# Patient Record
Sex: FEMALE | Race: WHITE
[De-identification: names, ages, dates, MRNs, and addresses within clinical notes are randomized per-mention and may not be internally consistent; named-entity substitution may affect disease eponyms.]

---

## 2017-12-23 ENCOUNTER — HOSPITAL ENCOUNTER (OUTPATIENT)
Dept: HOSPITAL 10 - OBT | Age: 32
Discharge: HOME | End: 2017-12-23
Attending: OBSTETRICS & GYNECOLOGY
Payer: MEDICAID

## 2017-12-23 VITALS
HEIGHT: 66 IN | BODY MASS INDEX: 31.14 KG/M2 | BODY MASS INDEX: 31.14 KG/M2 | WEIGHT: 193.79 LBS | WEIGHT: 193.79 LBS | HEIGHT: 66 IN

## 2017-12-23 VITALS — SYSTOLIC BLOOD PRESSURE: 121 MMHG | HEART RATE: 84 BPM | DIASTOLIC BLOOD PRESSURE: 84 MMHG

## 2017-12-23 DIAGNOSIS — Z3A.37: ICD-10-CM

## 2017-12-23 PROCEDURE — G0463 HOSPITAL OUTPT CLINIC VISIT: HCPCS

## 2017-12-23 NOTE — TRIAGE
===================================

OB Triage

===================================

Datetime Report Generated by CPN: 12/23/2017 16:37

   

   

===========================

Datetime: 12/23/2017 16:02

===========================

   

 Stage of Pregnancy:  OB Triage

   

===========================

Datetime: 12/23/2017 15:49

===========================

   

 Stage of Pregnancy:  OB Triage

   

===========================

Datetime: 12/23/2017 15:33

===========================

   

 Stage of Pregnancy:  Labor

 Assessment Type:  Triage

   

===================================

Maternal Assessment

===================================

   

 Level of Consciousness:  Fully Conscious

 DTR's/Clonus:  DTRs 2+; No Clonus

 Headache:  Denies

 Blurred Vision:  No

 Respiratory Effort:  Unlabored; Regular Rhythm; Equal Expansion

 Breath Sounds, Left:  Clear and Equal

 Breath Sounds, Right:  Clear and Equal

 Nausea/Vomiting:  Denies

 RUQ Epigastric Pain:  Denies

 Facial Edema:  None

 Temperature Route:  Axillary

   

===================================

Fall Risk Assessment

===================================

   

 History of Falling:  (0) No

 Secondary Diagnosis:  (0) No

 Ambulatory Aid:  (0) Bedrest/Nurse Assist

 IV Therapy:  (0) No

 Gait:  (0) Normal/Bedrest/Immobile

 Mental Status:  (0) Oriented to Own Ability

 Fall Score:  0

 Fall Risk Score Definition:  No Risk: No action required

   

===================================

Labor Evaluation

===================================

   

 Frequency:  0

 Monitor Mode:  External

 Resting Tone [place]:  Relaxed

 Fetal Interventions:  Sterile Vaginal Exam

   

===================================

Fetal Heart Rate

===================================

   

 FHR Baseline Rate:  135

 Monitor Mode:  External US

 Variability:  Moderate 6-25 bpm

 Accelerations:  10X10

 Decelerations:  None

 Category:  Category I

   

===================================

Pain Assessment

===================================

   

 Pain Scale:  7

 Pain Presence:  Intermittent

 Pain Type:  Cramping

 Pain Location:  Abdomen

 Pain Goal:  3

 Pain Relief Measures:  Comfort Measures

   

===================================

Vaginal Exam

===================================

   

 Dilatation (cms):  1.0

 Effacement (%):  50

 Station:  -2

 Exam By:  S HERIBERTO

 Membrane Status:  Intact

   

===========================

Datetime: 12/23/2017 15:32

===========================

   

 Time of Arrival:  12/23/2017 15:10

 EGA:  37.3

 Arrived By:  Ambulatory

 Arrived From:  Home

 Chief Complaint:  C/O UC'S THAT STARTED YESTERDAY APPROX Q7 MIN.  DENIES LEAKING OF FLUID OR BLEEDI
NG

 Fetal Movement:  Present

 Contractions:  Irregular

 Contractions:  5-7

 Rupture of Membranes:  Denies

 Vaginal Discharge:  Denies

 Recent Sexual Intercouse:  Denies

 Abdominal Trauma:  Not Applicable

 Time Provider Notified:  12/23/2017 15:49

 Provider Notified:  YOLANDE

 Initial Plan:  MONITOR, VE

## 2017-12-23 NOTE — PN
Triage Information


Date/Time





Reason for visit:  Uterine contractions


Weeks of Gestation


Patient is a 32-year-old  2 para 1 at 37 weeks and 3 days of gestation 

with estimated date of delivery January 10, 2018


She presents with complaint of uterine contractions


Patient reports positive fetal movement, no leaking fluid and no vaginal 

bleeding


/Para


 2 para 1


Diabetes:  none


Hypertention:  none





Objective





 Vital Signs








  Date Time  Temp Pulse Resp B/P Pulse Ox O2 Delivery O2 Flow Rate FiO2


 


17 15:38 98.4 84  121/84    








Fetal Heart Rate:  140's


Fetal Heart Rate Comments


Fetal heart rate tracing category 1


Contractions:  None


Exam


Vaginal exam 1 cm/50%/-2 per nurse


Disposition:  Discharge


Assessment/Plan


Fetal kick count instructions were given


Labor precautions were given


Patient will be discharged home


She was instructed to follow-up with her own OB/GYN in 2-3 days











NELIA LANIER MD Dec 23, 2017 16:15

## 2018-01-01 ENCOUNTER — HOSPITAL ENCOUNTER (OUTPATIENT)
Age: 33
LOS: 1 days | Discharge: HOME | End: 2018-01-02

## 2018-01-01 ENCOUNTER — HOSPITAL ENCOUNTER (OUTPATIENT)
Dept: HOSPITAL 91 - L-D | Age: 33
LOS: 1 days | Discharge: HOME | End: 2018-01-02
Payer: MEDICAID

## 2018-01-01 DIAGNOSIS — Z3A.38: ICD-10-CM

## 2018-01-07 ENCOUNTER — HOSPITAL ENCOUNTER (INPATIENT)
Age: 33
LOS: 2 days | Discharge: HOME | End: 2018-01-09

## 2018-01-07 ENCOUNTER — HOSPITAL ENCOUNTER (INPATIENT)
Dept: HOSPITAL 91 - OBT | Age: 33
LOS: 2 days | Discharge: HOME | End: 2018-01-09
Payer: MEDICAID

## 2018-01-07 DIAGNOSIS — Z3A.39: ICD-10-CM

## 2018-01-07 LAB
ADD MAN DIFF?: NO
ADD UMIC: YES
BASOPHIL #: 0 10^3/UL (ref 0–0.1)
BASOPHILS %: 0.4 % (ref 0–2)
EOSINOPHILS #: 0.1 10^3/UL (ref 0–0.5)
EOSINOPHILS %: 1.1 % (ref 0–7)
HEMATOCRIT: 31.9 % (ref 37–47)
HEMOGLOBIN: 10.6 G/DL (ref 12–16)
HEPATITIS B SURFACE ANTIGEN: NEGATIVE
INR: 0.89
LYMPHOCYTES #: 2.5 10^3/UL (ref 0.8–2.9)
LYMPHOCYTES %: 27.3 % (ref 15–51)
MEAN CORPUSCULAR HEMOGLOBIN: 26.1 PG (ref 29–33)
MEAN CORPUSCULAR HGB CONC: 33.2 G/DL (ref 32–37)
MEAN CORPUSCULAR VOLUME: 78.6 FL (ref 82–101)
MEAN PLATELET VOLUME: 9.6 FL (ref 7.4–10.4)
MONOCYTE #: 0.6 10^3/UL (ref 0.3–0.9)
MONOCYTES %: 6.6 % (ref 0–11)
NEUTROPHIL #: 5.9 10^3/UL (ref 1.6–7.5)
NEUTROPHILS %: 64.1 % (ref 39–77)
NUCLEATED RED BLOOD CELLS #: 0 10^3/UL (ref 0–0)
NUCLEATED RED BLOOD CELLS%: 0 /100WBC (ref 0–0)
PARTIAL THROMBOPLASTIN TIME: 25.8 SEC (ref 25–35)
PLATELET COUNT: 299 10^3/UL (ref 140–415)
PROTIME: 12.1 SEC (ref 11.9–14.9)
PT RATIO: 0.9
RAPID PLASMA REAGIN: NONREACTIVE
RED BLOOD COUNT: 4.06 10^6/UL (ref 4.2–5.4)
RED CELL DISTRIBUTION WIDTH: 14.5 % (ref 11.5–14.5)
UR ASCORBIC ACID: 40 MG/DL
UR BACTERIA: (no result) /HPF
UR BILIRUBIN (DIP): NEGATIVE MG/DL
UR BLOOD (DIP): NEGATIVE MG/DL
UR CLARITY: (no result)
UR COLOR: YELLOW
UR GLUCOSE (DIP): NEGATIVE MG/DL
UR KETONES (DIP): NEGATIVE MG/DL
UR LEUKOCYTE ESTERASE (DIP): (no result) LEU/UL
UR MUCUS: (no result) /HPF
UR NITRITE (DIP): NEGATIVE MG/DL
UR PH (DIP): 6 (ref 5–9)
UR RBC: 1 /HPF (ref 0–5)
UR SPECIFIC GRAVITY (DIP): 1.03 (ref 1–1.03)
UR SQUAMOUS EPITHELIAL CELL: (no result) /HPF
UR TOTAL PROTEIN (DIP): (no result) MG/DL
UR UROBILINOGEN (DIP): (no result) MG/DL
UR WBC: 24 /HPF (ref 0–5)
WHITE BLOOD COUNT: 9.2 10^3/UL (ref 4.8–10.8)

## 2018-01-07 PROCEDURE — 90716 VAR VACCINE LIVE SUBQ: CPT

## 2018-01-07 PROCEDURE — 80307 DRUG TEST PRSMV CHEM ANLYZR: CPT

## 2018-01-07 PROCEDURE — 87086 URINE CULTURE/COLONY COUNT: CPT

## 2018-01-07 PROCEDURE — 87340 HEPATITIS B SURFACE AG IA: CPT

## 2018-01-07 PROCEDURE — 86592 SYPHILIS TEST NON-TREP QUAL: CPT

## 2018-01-07 PROCEDURE — 86900 BLOOD TYPING SEROLOGIC ABO: CPT

## 2018-01-07 PROCEDURE — 86850 RBC ANTIBODY SCREEN: CPT

## 2018-01-07 PROCEDURE — 85025 COMPLETE CBC W/AUTO DIFF WBC: CPT

## 2018-01-07 PROCEDURE — 86901 BLOOD TYPING SEROLOGIC RH(D): CPT

## 2018-01-07 PROCEDURE — 85610 PROTHROMBIN TIME: CPT

## 2018-01-07 PROCEDURE — 90715 TDAP VACCINE 7 YRS/> IM: CPT

## 2018-01-07 PROCEDURE — 81001 URINALYSIS AUTO W/SCOPE: CPT

## 2018-01-07 PROCEDURE — 85730 THROMBOPLASTIN TIME PARTIAL: CPT

## 2018-01-07 RX ADMIN — Medication 1 SPRAY: at 17:01

## 2018-01-07 RX ADMIN — Medication 1 MLS/HR: at 16:21

## 2018-01-07 RX ADMIN — BUTORPHANOL TARTRATE 1 MG: 2 INJECTION, SOLUTION INTRAMUSCULAR; INTRAVENOUS at 11:31

## 2018-01-07 RX ADMIN — Medication 1 APPLIC: at 17:02

## 2018-01-07 RX ADMIN — Medication 1 MLS/HR: at 09:41

## 2018-01-07 RX ADMIN — WITCH HAZEL 1 PAD: 500 SOLUTION RECTAL; TOPICAL at 17:02

## 2018-01-07 RX ADMIN — DOCUSATE SODIUM AND SENNOSIDES 1 TAB: 8.6; 5 TABLET, FILM COATED ORAL at 20:35

## 2018-01-07 RX ADMIN — IBUPROFEN 1 MG: 600 TABLET ORAL at 18:28

## 2018-01-07 RX ADMIN — PYRIDOXINE HYDROCHLORIDE 1 MLS/HR: 100 INJECTION, SOLUTION INTRAMUSCULAR; INTRAVENOUS at 05:37

## 2018-01-07 RX ADMIN — Medication 1 MLS/HR: at 13:41

## 2018-01-07 RX ADMIN — IBUPROFEN 1 MG: 600 TABLET ORAL at 23:47

## 2018-01-07 RX ADMIN — AMPICILLIN 1 MLS/HR: 2 INJECTION, POWDER, FOR SOLUTION INTRAVENOUS at 04:00

## 2018-01-08 LAB
ADD MAN DIFF?: NO
BASOPHIL #: 0.1 10^3/UL (ref 0–0.1)
BASOPHILS %: 0.5 % (ref 0–2)
EOSINOPHILS #: 0.1 10^3/UL (ref 0–0.5)
EOSINOPHILS %: 0.6 % (ref 0–7)
HEMATOCRIT: 27.9 % (ref 37–47)
HEMOGLOBIN: 9.2 G/DL (ref 12–16)
LYMPHOCYTES #: 2.5 10^3/UL (ref 0.8–2.9)
LYMPHOCYTES %: 22.8 % (ref 15–51)
MEAN CORPUSCULAR HEMOGLOBIN: 26.2 PG (ref 29–33)
MEAN CORPUSCULAR HGB CONC: 33 G/DL (ref 32–37)
MEAN CORPUSCULAR VOLUME: 79.5 FL (ref 82–101)
MEAN PLATELET VOLUME: 9.5 FL (ref 7.4–10.4)
MONOCYTE #: 0.6 10^3/UL (ref 0.3–0.9)
MONOCYTES %: 5.3 % (ref 0–11)
NEUTROPHIL #: 7.6 10^3/UL (ref 1.6–7.5)
NEUTROPHILS %: 70.3 % (ref 39–77)
NUCLEATED RED BLOOD CELLS #: 0 10^3/UL (ref 0–0)
NUCLEATED RED BLOOD CELLS%: 0 /100WBC (ref 0–0)
PLATELET COUNT: 265 10^3/UL (ref 140–415)
RED BLOOD COUNT: 3.51 10^6/UL (ref 4.2–5.4)
RED CELL DISTRIBUTION WIDTH: 14.9 % (ref 11.5–14.5)
WHITE BLOOD COUNT: 10.8 10^3/UL (ref 4.8–10.8)

## 2018-01-08 RX ADMIN — DOCUSATE SODIUM AND SENNOSIDES 1 TAB: 8.6; 5 TABLET, FILM COATED ORAL at 10:06

## 2018-01-08 RX ADMIN — IBUPROFEN 1 MG: 600 TABLET ORAL at 18:03

## 2018-01-08 RX ADMIN — DOCUSATE SODIUM AND SENNOSIDES 1 TAB: 8.6; 5 TABLET, FILM COATED ORAL at 20:54

## 2018-01-08 RX ADMIN — IBUPROFEN 1 MG: 600 TABLET ORAL at 05:37

## 2018-01-08 RX ADMIN — IBUPROFEN 1 MG: 600 TABLET ORAL at 12:36

## 2018-01-09 RX ADMIN — IBUPROFEN 1 MG: 600 TABLET ORAL at 06:19

## 2018-01-09 RX ADMIN — CLOSTRIDIUM TETANI TOXOID ANTIGEN (FORMALDEHYDE INACTIVATED), CORYNEBACTERIUM DIPHTHERIAE TOXOID ANTIGEN (FORMALDEHYDE INACTIVATED), BORDETELLA PERTUSSIS TOXOID ANTIGEN (GLUTARALDEHYDE INACTIVATED), BORDETELLA PERTUSSIS FILAMENTOUS HEMAGGLUTININ ANTIGEN (FORMALDEHYDE INACTIVATED), BORDETELLA PERTUSSIS PERTACTIN ANTIGEN, AND BORDETELLA PERTUSSIS FIMBRIAE 2/3 ANTIGEN 1 ML: 5; 2; 2.5; 5; 3; 5 INJECTION, SUSPENSION INTRAMUSCULAR at 09:00

## 2018-01-09 RX ADMIN — DOCUSATE SODIUM AND SENNOSIDES 1 TAB: 8.6; 5 TABLET, FILM COATED ORAL at 12:19

## 2018-01-09 RX ADMIN — IBUPROFEN 1 MG: 600 TABLET ORAL at 12:19

## 2018-01-09 RX ADMIN — IBUPROFEN 1 MG: 600 TABLET ORAL at 00:50

## 2018-01-09 RX ADMIN — VARICELLA VIRUS VACCINE LIVE 1 UNIT: 1350 INJECTION, POWDER, LYOPHILIZED, FOR SUSPENSION SUBCUTANEOUS at 09:00

## 2018-01-09 RX ADMIN — MEASLES, MUMPS, AND RUBELLA VIRUS VACCINE LIVE 1 ML: 1000; 12500; 1000 INJECTION, POWDER, LYOPHILIZED, FOR SUSPENSION SUBCUTANEOUS at 09:00

## 2019-07-09 ENCOUNTER — HOSPITAL ENCOUNTER (OUTPATIENT)
Dept: HOSPITAL 10 - SDS | Age: 34
Discharge: HOME | End: 2019-07-09
Attending: OTOLARYNGOLOGY
Payer: COMMERCIAL

## 2019-07-09 ENCOUNTER — HOSPITAL ENCOUNTER (OUTPATIENT)
Dept: HOSPITAL 91 - SDS | Age: 34
Discharge: HOME | End: 2019-07-09
Payer: COMMERCIAL

## 2019-07-09 VITALS — SYSTOLIC BLOOD PRESSURE: 121 MMHG | RESPIRATION RATE: 14 BRPM | HEART RATE: 95 BPM | DIASTOLIC BLOOD PRESSURE: 77 MMHG

## 2019-07-09 VITALS — DIASTOLIC BLOOD PRESSURE: 78 MMHG | RESPIRATION RATE: 15 BRPM | SYSTOLIC BLOOD PRESSURE: 121 MMHG | HEART RATE: 76 BPM

## 2019-07-09 VITALS
BODY MASS INDEX: 32.07 KG/M2 | WEIGHT: 187.83 LBS | HEIGHT: 64 IN | WEIGHT: 187.83 LBS | BODY MASS INDEX: 32.07 KG/M2 | HEIGHT: 64 IN

## 2019-07-09 VITALS — HEART RATE: 92 BPM | DIASTOLIC BLOOD PRESSURE: 75 MMHG | RESPIRATION RATE: 20 BRPM | SYSTOLIC BLOOD PRESSURE: 133 MMHG

## 2019-07-09 VITALS — SYSTOLIC BLOOD PRESSURE: 129 MMHG | HEART RATE: 69 BPM | DIASTOLIC BLOOD PRESSURE: 86 MMHG | RESPIRATION RATE: 16 BRPM

## 2019-07-09 VITALS — DIASTOLIC BLOOD PRESSURE: 76 MMHG | HEART RATE: 88 BPM | SYSTOLIC BLOOD PRESSURE: 128 MMHG | RESPIRATION RATE: 18 BRPM

## 2019-07-09 VITALS — SYSTOLIC BLOOD PRESSURE: 127 MMHG | HEART RATE: 76 BPM | RESPIRATION RATE: 20 BRPM | DIASTOLIC BLOOD PRESSURE: 76 MMHG

## 2019-07-09 VITALS — SYSTOLIC BLOOD PRESSURE: 119 MMHG | HEART RATE: 75 BPM | DIASTOLIC BLOOD PRESSURE: 76 MMHG | RESPIRATION RATE: 20 BRPM

## 2019-07-09 VITALS — RESPIRATION RATE: 16 BRPM | HEART RATE: 74 BPM | SYSTOLIC BLOOD PRESSURE: 122 MMHG | DIASTOLIC BLOOD PRESSURE: 72 MMHG

## 2019-07-09 VITALS — RESPIRATION RATE: 15 BRPM | SYSTOLIC BLOOD PRESSURE: 132 MMHG | DIASTOLIC BLOOD PRESSURE: 73 MMHG | HEART RATE: 94 BPM

## 2019-07-09 VITALS — RESPIRATION RATE: 17 BRPM | DIASTOLIC BLOOD PRESSURE: 77 MMHG | SYSTOLIC BLOOD PRESSURE: 125 MMHG | HEART RATE: 86 BPM

## 2019-07-09 VITALS — SYSTOLIC BLOOD PRESSURE: 125 MMHG | RESPIRATION RATE: 15 BRPM | DIASTOLIC BLOOD PRESSURE: 77 MMHG | HEART RATE: 74 BPM

## 2019-07-09 VITALS — RESPIRATION RATE: 15 BRPM | DIASTOLIC BLOOD PRESSURE: 79 MMHG | HEART RATE: 82 BPM | SYSTOLIC BLOOD PRESSURE: 128 MMHG

## 2019-07-09 DIAGNOSIS — Q18.0: Primary | ICD-10-CM

## 2019-07-09 PROCEDURE — 42810 EXCISION OF NECK CYST: CPT

## 2019-07-09 PROCEDURE — 88304 TISSUE EXAM BY PATHOLOGIST: CPT

## 2019-07-09 RX ADMIN — FENTANYL CITRATE 1 MCG: 50 INJECTION, SOLUTION INTRAMUSCULAR; INTRAVENOUS at 19:42

## 2019-07-09 RX ADMIN — PYRIDOXINE HYDROCHLORIDE 1 MLS/HR: 100 INJECTION, SOLUTION INTRAMUSCULAR; INTRAVENOUS at 15:51

## 2019-07-09 RX ADMIN — LIDOCAINE HYDROCHLORIDE 1 ML: 10; .005 INJECTION, SOLUTION EPIDURAL; INFILTRATION; INTRACAUDAL; PERINEURAL at 18:26

## 2019-07-09 NOTE — PREAC
Date/Time of Note


Date/Time of Note


DATE: 7/9/19 


TIME: 17:39





Anesthesia Eval and Record


Evaluation


Time Pre-Procedure Interview


DATE: 7/9/19 


TIME: 17:39


Age


34


Sex


female


NPO:  8 hrs


Preoperative diagnosis


LEFT PERIAURICULAR CYST


Planned procedure


EXCISION LEFT PERIAURICULAR CYST





Past Medical History


Past Medical History:  Includes


GI:  Obesity





Surgery & Anesthesia Issues


No known issue





Meds


Anticoagulation:  No


Beta Blocker within 24 hr:  No


Reason Beta Blocker not given:  Pt. not on B-Blocker


Reported Medications


PNV115-Iron Fumarate-FA-DSS (Prenatal 19) 1 Each Tablet, 1 TAB PO DAILY, TAB


   12/23/17





Current Medications


Lactated Ringer's 1,000 ml @  25 mls/hr Q24H IV  Last administered on 7/9/19at 


15:51; Admin Dose 25 MLS/HR;  Start 7/9/19 at 16:00


Meds reviewed:  Yes





Allergies


Coded Allergies:  


     No Known Allergy (Unverified , 1/7/18)


Allergies Reviewed:  Yes





Labs/Studies


Labs Reviewed:  Reviewed by anesthesiologist


Pregnancy test:  Negative





Pre-procedure Exam


Last vitals





Vital Signs


  Date      Temp  Pulse  Resp  B/P (MAP)   Pulse Ox  O2          O2 Flow    FiO2


Time                                                 Delivery    Rate


    7/9/19  98.8     69    16      129/86       100  Room Air


     15:38                          (100)





Airway:  Adequate mouth opening, Adequate thyromental dist


Mallampati:  Mallampati II


Teeth:  Normal


Lung:  Normal


Heart:  Normal





ASA Physical Status


ASA physical status:  2


Emergency:  None





Planned Anesthetic


General/MAC:  ETT, MAC





Planned Pain Management


Parenteral pain med





Pre-operative Attestations


Prior to commencing anesthesia and surgery, the patient was re-evaluated, there 


was verification of:


*The patient's identity


*The results of appropriate recent lab work and preoperative vital signs


*The above evaluation not changing prior to induction


*Anesthetic plan, risk benefits, alternative and complications discussed with 


patient/family; questions answered; patient/family understands, accepts and 


wishes to proceed.











José Miguel Haile M.D.       Jul 9, 2019 17:40

## 2019-07-09 NOTE — PAC
Date/Time of Note


Date/Time of Note


DATE: 7/9/19 


TIME: 18:55





Post-Anesthesia Notes


Post-Anesthesia Note


Last documented vital signs





Vital Signs


  Date      Temp  Pulse  Resp  B/P (MAP)  Pulse Ox  O2          O2 Flow     FiO2


Time                                                Delivery    Rate


    7/9/19    98     70    16     120/62       100  Room Air


      1900





Activity:  WNL


Respiratory function:  WNL


Cardiovascular function:  WNL


Mental status:  Baseline


Pain reasonably controlled:  Yes


Hydration appropriate:  Yes


Nausea/Vomiting absent:  Yes











AJ ORDONEZ DO              Jul 9, 2019 18:56

## 2019-07-09 NOTE — HPN
Date/Time of Note


Date/Time of Note


DATE: 7/9/19 


TIME: 17:42





Interval H&P Admission Note


Pt. seen H&P reviewed:  No system changes











ANDREY GHOTRA MD                 Jul 9, 2019 17:42

## 2019-07-09 NOTE — OPR
Date/Time of Note


Date/Time of Note


DATE: 7/9/19 


TIME: 18:35





Operative Report


Procedure Date:  Jul 9, 2019


Preoperative Diagnosis


Left preauricular sinus tract.


Postoperative Diagnosis


Same


Operation/Procedure Performed


Excision first branchial cleft preauricular sinus tract


Surgeon


see signature line


Assistant


None


Anesthesia Type:  general


Estimated Blood Loss:  0 - 10 ml's


Transfusion


   none


Specimen


Left preauricular sinus tract.


Grafts/Implants


none


Complications


none


Pt Condition Post Procedure:  stable


Disposition:  PACU


Indications


Left preauricular sinus.


Procedure Description


Description of procedure: The patient was identified in the holding area. We had


a   


discussion to confirm understanding of indications, risks, benefits, 


alternatives and   


postoperative care associated with the operation. Informed consent was signed. 


The patient was taken the operating room and placed supine on the operating 


table.  


 


General endotracheal anesthesia was achieved with an LMA. A shoulder roll was 


placed. The left ear and face were   


prepped and draped in normal sterile fashion. A 15 blade was used to make an 


elliptical incision around the helical root and pit.  A lacrimal probe was used 


to estimate the trajectory of the tract. 


Sharp excision was performed to the retro parotid soft tissues.  The gland was 


not involved. 





All components of the tract were removed with sharp technique. Irrigation was 


followed by layered closure. The patient tolerated the   


procedure well and was extubated, taken to PACU in stable condition.   


    


Complications: None











ANDREY GHOTRA MD                 Jul 9, 2019 18:38